# Patient Record
(demographics unavailable — no encounter records)

---

## 2024-12-03 NOTE — HISTORY OF PRESENT ILLNESS
[FreeTextEntry1] : RPA-  acne, acne Scarring  [de-identified] : Shukri Marmolejo 20 y/o F presents for facial acne scarring. Saw Dr Hassan 2022 for same issue.     #Acne and acne scarring.  Active on face. Better than it was previously , mostly has scarring now. Still flares w/ menses.  Prior treatments:  -Tret 0.1 and 0.05% made face peel a lot so she stopped -dapsone gel in 2021, did not see much difference -OCP for PCOS, stopped 3 mo ago d/t weight gain and mood changes -Harris 100mg qd, stopped for menstrual irregularities.  -Winlevi x few months years ago. did not think it helped.   #"eczema" patch on L buttock , x 1 year. On and off. Itchy.  has tried vaseline. Uses wet wipes   #New dark mole on R palm.  Personal history of skin cancer: No  Family history of skin cancer: No  History of blistering sunburns: No  History of tanning bed use: No  Uses sunscreen regularly: Yes

## 2024-12-03 NOTE — ASSESSMENT
[FreeTextEntry1] : #Acne Prior treatments: -Tret 0.1 and 0.05% made face peel a lot so she stopped -dapsone gel in 2021, did not see much difference -OCP for PCOS, stopped 3 mo ago d/t weight gain and mood changes -Posey 100mg qd, stopped for menstrual irregularities. -Winlevi x few months years ago. did not think it helped.  -start tretinoin .025% TIW. SED including skin irritation and peeling. Discontinue if pregnant or breastfeeding. Proper application technique reviewed. - start Amzeeq foam qAM to AA  #Acne scars- icepick, boxcar TCA cross. pt aware this is OOP, declines for now Ok to trial tretinoin first   #Scaling papulosquamous plaque - L gluteal cleft/buttock ddx: tinea, psoriasis, nummular derm STOP wet wipes -start ketoconazole cream BID x2-4 weeks until resolved.  If not resolved or if worsening, apply HCN 2.5% oint bid for two weeks on and 1 weeks off as needed for itching. recheck 12 weeks or sooner  #Acral nevus- R palm Patient was reassured of the benign nature of these findings. No further treatment needed at this time. If any lesion changes or becomes symptomatic I recommend follow-up  RTC 12 weeks or sooner prn

## 2024-12-03 NOTE — ASSESSMENT
[FreeTextEntry1] : #Acne Prior treatments: -Tret 0.1 and 0.05% made face peel a lot so she stopped -dapsone gel in 2021, did not see much difference -OCP for PCOS, stopped 3 mo ago d/t weight gain and mood changes -Bristow 100mg qd, stopped for menstrual irregularities. -Winlevi x few months years ago. did not think it helped.  -start tretinoin .025% TIW. SED including skin irritation and peeling. Discontinue if pregnant or breastfeeding. Proper application technique reviewed. - start Amzeeq foam qAM to AA  #Acne scars- icepick, boxcar TCA cross. pt aware this is OOP, declines for now Ok to trial tretinoin first   #Scaling papulosquamous plaque - L gluteal cleft/buttock ddx: tinea, psoriasis, nummular derm STOP wet wipes -start ketoconazole cream BID x2-4 weeks until resolved.  If not resolved or if worsening, apply HCN 2.5% oint bid for two weeks on and 1 weeks off as needed for itching. recheck 12 weeks or sooner  #Acral nevus- R palm Patient was reassured of the benign nature of these findings. No further treatment needed at this time. If any lesion changes or becomes symptomatic I recommend follow-up  RTC 12 weeks or sooner prn

## 2024-12-03 NOTE — PHYSICAL EXAM
[FreeTextEntry3] : On the jawline and cheeks are scattered open and closed comedones and few inflammatory pink papules.  cheeks: mostly icepick and boxcar scars  R palm: 2mm black macule , reassuring dermoscopy pattern  L gluteal cleft extending to perianal skin: erythematous well circumscribed scaling thin plaque

## 2024-12-03 NOTE — HISTORY OF PRESENT ILLNESS
[FreeTextEntry1] : RPA-  acne, acne Scarring  [de-identified] : Shukri Marmolejo 22 y/o F presents for facial acne scarring. Saw Dr Hassan 2022 for same issue.     #Acne and acne scarring.  Active on face. Better than it was previously , mostly has scarring now. Still flares w/ menses.  Prior treatments:  -Tret 0.1 and 0.05% made face peel a lot so she stopped -dapsone gel in 2021, did not see much difference -OCP for PCOS, stopped 3 mo ago d/t weight gain and mood changes -Harris 100mg qd, stopped for menstrual irregularities.  -Winlevi x few months years ago. did not think it helped.   #"eczema" patch on L buttock , x 1 year. On and off. Itchy.  has tried vaseline. Uses wet wipes   #New dark mole on R palm.  Personal history of skin cancer: No  Family history of skin cancer: No  History of blistering sunburns: No  History of tanning bed use: No  Uses sunscreen regularly: Yes

## 2025-04-01 NOTE — COUNSELING
[Nutrition/ Exercise/ Weight Management] : nutrition, exercise, weight management [Vitamins/Supplements] : vitamins/supplements [Breast Self Exam] : breast self exam [Contraception/ Emergency Contraception/ Safe Sexual Practices] : contraception, emergency contraception, safe sexual practices [Confidentiality] : confidentiality [Medication Management] : medication management [Yes] : Risk of tobacco use and health benefits of smoking cessation discussed: Yes [Cessation strategies including cessation program discussed] : Cessation strategies including cessation program discussed [Encouraged to pick a quit date and identify support needed to quit] : Encouraged to pick a quit date and identify support needed to quit [FreeTextEntry1] : 5

## 2025-04-01 NOTE — PLAN
[FreeTextEntry1] : Health Maintenance: BSA, calcium, vitamin D and exercise d/w pt Pap w/ Gc/Chl conducted STD bloodwork panel drawn today Advised pt to see PCP and dermatologist annually  Increased Discharge:  Vaginitis swab done   Urinary Retention UA/UCx done today   Contraception Counseling & S/P TOP: R/B/A COCPS vs. LARCs (IUD, Nexplanon) vs Nuvaring reviewed in detail  Nextellis Rx sent  RTO for Nexplanon insertion D/w pt how to take OCP, and also R/B including but not limited to irregular bleeding, changes in mood, weight changes, VTE, MI, Stroke, GB and liver disease. Advised pt to avoid smoking.  D/w pt FHx of no blood clot disorders  Marijuana/Tobacco Cessation Counseling:  Pt quit vaping 1 month ago, monthly marijuana usage Risk and benefits of smoking cessation discussed in detail  Strongly discouraged smoking when on OCPs I spent 5 minutes on this encounter  RTO for Nexplanon insertion if Nextstellis not acceptable for her.

## 2025-04-01 NOTE — HISTORY OF PRESENT ILLNESS
[Currently Active] : currently active [Men] : men [No] : No [FreeTextEntry1] : 2025. OSMAN JONES 22 year old female G0 LMP 2025. She presents for an annual gyn exam.  She reports monthly menses, not too heavy, and moderately painful. She notes bleeding after Plan B use last month, but no other denies intermenstrual bleeding   She notes slight urinary retention for the past week. She reports otherwise normal urination, no dysuria, no incontinence of urine.  She denies abdominal and pelvic pain. BM is normal per patient, no blood in stool or constipation/diarrhea.   She notes slightly increased vaginal discharge around 1 week ago.   Of note, she had a medication termination 2024. She is interested in restarting birth control. She is sexually active with male partner, denies dysfunction. No condom use.   She was formerly on Sprintec & Junel, but discontinued  due to weight gain side effects. Sees PCP annually.    GynHx: No Hx of STI, fibroids, ovarian cysts, abnl paps, or pelvic infections. PMH: anxiety, asthma, H/o PCOS Meds: denies OBHx:  - medical TOP x 1 2024 SHx: denies  FHx: Father - DM. Denies FHx of breast, ovarian, uterine, colon, pancreatic, prostate cancer. All: NKDA Social: Social alcohol, former vape  (quit 1 mo ago). Occasional marijuana usage. 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  PHQ9=3

## 2025-04-01 NOTE — PHYSICAL EXAM

## 2025-04-01 NOTE — PROCEDURE
[Cervical Pap Smear] : cervical Pap smear [Liquid Base] : liquid base [Tolerated Well] : the patient tolerated the procedure well [No Complications] : there were no complications [GC & Chlamydia via Pap] : GC & Chlamydia via Pap [de-identified] : vaginitis panel